# Patient Record
Sex: FEMALE | Race: ASIAN | NOT HISPANIC OR LATINO
[De-identification: names, ages, dates, MRNs, and addresses within clinical notes are randomized per-mention and may not be internally consistent; named-entity substitution may affect disease eponyms.]

---

## 2019-06-06 PROBLEM — Z00.00 ENCOUNTER FOR PREVENTIVE HEALTH EXAMINATION: Status: ACTIVE | Noted: 2019-06-06

## 2019-06-17 ENCOUNTER — APPOINTMENT (OUTPATIENT)
Dept: COLORECTAL SURGERY | Facility: CLINIC | Age: 29
End: 2019-06-17
Payer: COMMERCIAL

## 2019-06-17 VITALS
WEIGHT: 125 LBS | DIASTOLIC BLOOD PRESSURE: 84 MMHG | HEIGHT: 60 IN | TEMPERATURE: 98.3 F | HEART RATE: 78 BPM | SYSTOLIC BLOOD PRESSURE: 123 MMHG | OXYGEN SATURATION: 99 % | BODY MASS INDEX: 24.54 KG/M2

## 2019-06-17 DIAGNOSIS — Z82.49 FAMILY HISTORY OF ISCHEMIC HEART DISEASE AND OTHER DISEASES OF THE CIRCULATORY SYSTEM: ICD-10-CM

## 2019-06-17 DIAGNOSIS — Z91.018 ALLERGY TO OTHER FOODS: ICD-10-CM

## 2019-06-17 DIAGNOSIS — Z78.9 OTHER SPECIFIED HEALTH STATUS: ICD-10-CM

## 2019-06-17 DIAGNOSIS — Z80.3 FAMILY HISTORY OF MALIGNANT NEOPLASM OF BREAST: ICD-10-CM

## 2019-06-17 DIAGNOSIS — Z91.09 OTHER ALLERGY STATUS, OTHER THAN TO DRUGS AND BIOLOGICAL SUBSTANCES: ICD-10-CM

## 2019-06-17 DIAGNOSIS — Z80.7 FAMILY HISTORY OF OTHER MALIGNANT NEOPLASMS OF LYMPHOID, HEMATOPOIETIC AND RELATED TISSUES: ICD-10-CM

## 2019-06-17 DIAGNOSIS — Z83.79 FAMILY HISTORY OF OTHER DISEASES OF THE DIGESTIVE SYSTEM: ICD-10-CM

## 2019-06-17 DIAGNOSIS — Z72.3 LACK OF PHYSICAL EXERCISE: ICD-10-CM

## 2019-06-17 DIAGNOSIS — L91.8 OTHER HYPERTROPHIC DISORDERS OF THE SKIN: ICD-10-CM

## 2019-06-17 DIAGNOSIS — Z80.0 FAMILY HISTORY OF MALIGNANT NEOPLASM OF DIGESTIVE ORGANS: ICD-10-CM

## 2019-06-17 DIAGNOSIS — Z80.1 FAMILY HISTORY OF MALIGNANT NEOPLASM OF TRACHEA, BRONCHUS AND LUNG: ICD-10-CM

## 2019-06-17 PROCEDURE — 46600 DIAGNOSTIC ANOSCOPY SPX: CPT

## 2019-06-17 PROCEDURE — 99203 OFFICE O/P NEW LOW 30 MIN: CPT | Mod: 25

## 2019-06-18 NOTE — HISTORY OF PRESENT ILLNESS
[FreeTextEntry1] : 29yo female presents for evaluation of anal fissure and skin tag, referred by Dr Eastman. \par Chart referral notes reviewed. \par Has been managed by her GI since Feb 2018 for BRBPR, bloating and anal pain/burning. Seen previously by CRS Dr Taveras Oct 2018, started taking fiber supplement for hemorrhoids and skin tag at that time, symptoms persisted despite topical hemorrhoid cream. Followed up with GI, s/p IRCx2 for hemorrhoids. Also noted to have anal fissure with tag on exam by GI since 10/2018, managed with nifedipine cream. Seen in f/u April 2019 noted to have posterior midline anal fissure, non compliant with nifedipine cream per GI notes. Switched to Rectiv by Dr Eastman as patient is considering getting pregnant soon. \par Per patient never started Rectiv due to medication being too expensive\par Has been using nifedipine since April at least daily, trying to use BID but not always\par Continues to feel pain with some but not all BMs, occasional BRBPR, would like skin tag removed\par Moves bowels every morning, on average 2-3 BMs per day\par Denies diarrhea or constipation\par Has decided to delay pregnancy until after fissure is managed\par \par Never had a colonoscopy\par Family history of CRC in grandmother (age 93)

## 2019-06-18 NOTE — CONSULT LETTER
[Dear  ___] : Dear  [unfilled], [Consult Letter:] : I had the pleasure of evaluating your patient, [unfilled]. [( Thank you for referring [unfilled] for consultation for _____ )] : Thank you for referring [unfilled] for consultation for [unfilled] [Please see my note below.] : Please see my note below. [Consult Closing:] : Thank you very much for allowing me to participate in the care of this patient.  If you have any questions, please do not hesitate to contact me. [Sincerely,] : Sincerely, [FreeTextEntry2] : Dr. Maria L Eastman M.D\par \par  [FreeTextEntry3] : Berry MD

## 2019-06-18 NOTE — PHYSICAL EXAM
[FreeTextEntry1] : Medical assistant present for duration of physical examination\par \par Gen NAD, AOx3\par \par Anorectal Exam:\par Inspection no erythema, induration or fluctuance, no skin excoriation, posterior midline anal fissure with prominent associated tag, small anterior external hemorrhoidal tag\par [place] nontender, no masses palpated, no blood on gloved finger\par Anoscopy chronic appearing posterior midline fissure, mild nonfriable internal hemorrhoids\par \par

## 2019-06-26 ENCOUNTER — TRANSCRIPTION ENCOUNTER (OUTPATIENT)
Age: 29
End: 2019-06-26

## 2019-08-02 ENCOUNTER — RESULT REVIEW (OUTPATIENT)
Age: 29
End: 2019-08-02

## 2019-08-02 ENCOUNTER — OUTPATIENT (OUTPATIENT)
Dept: OUTPATIENT SERVICES | Facility: HOSPITAL | Age: 29
LOS: 1 days | Discharge: HOME | End: 2019-08-02
Payer: COMMERCIAL

## 2019-08-02 ENCOUNTER — APPOINTMENT (OUTPATIENT)
Dept: COLORECTAL SURGERY | Facility: CLINIC | Age: 29
End: 2019-08-02

## 2019-08-02 PROCEDURE — 46505 CHEMODENERVATION ANAL MUSC: CPT | Mod: GC,59

## 2019-08-02 PROCEDURE — 46200 REMOVAL OF ANAL FISSURE: CPT | Mod: GC

## 2019-08-02 RX ORDER — SENNOSIDES/DOCUSATE SODIUM 8.6MG-50MG
1 TABLET ORAL
Qty: 10 | Refills: 0
Start: 2019-08-02

## 2019-08-09 LAB — SURGICAL PATHOLOGY STUDY: SIGNIFICANT CHANGE UP

## 2019-10-07 ENCOUNTER — APPOINTMENT (OUTPATIENT)
Dept: COLORECTAL SURGERY | Facility: CLINIC | Age: 29
End: 2019-10-07
Payer: COMMERCIAL

## 2019-10-07 VITALS
HEART RATE: 69 BPM | OXYGEN SATURATION: 99 % | DIASTOLIC BLOOD PRESSURE: 79 MMHG | HEIGHT: 60 IN | WEIGHT: 125 LBS | TEMPERATURE: 98.3 F | SYSTOLIC BLOOD PRESSURE: 117 MMHG | BODY MASS INDEX: 24.54 KG/M2

## 2019-10-07 DIAGNOSIS — K60.2 ANAL FISSURE, UNSPECIFIED: ICD-10-CM

## 2019-10-07 DIAGNOSIS — K64.8 OTHER HEMORRHOIDS: ICD-10-CM

## 2019-10-07 PROCEDURE — 99024 POSTOP FOLLOW-UP VISIT: CPT

## 2019-10-07 RX ORDER — HYDROCORTISONE 25 MG/G
2.5 CREAM TOPICAL DAILY
Qty: 1 | Refills: 3 | Status: ACTIVE | COMMUNITY
Start: 2019-10-07 | End: 1900-01-01

## 2019-10-07 NOTE — ASSESSMENT
[FreeTextEntry1] : Recovering well, patient reassured\par Continue high fiber diet, adequate oral hydration, stool softeners and sitz baths as needed\par Avoid constipation and straining\par Hydrocortisone prn if hemorrhoidal symptoms\par All questions were answered, patient expressed understanding, and is agreeable to this plan.\par

## 2019-10-07 NOTE — PHYSICAL EXAM
[FreeTextEntry1] : Medical assistant present for duration of physical examination\par \par Gen NAD, AOx3\par \par Anorectal Exam:\par Inspection no erythema, induration or fluctuance, posterior midline fissurectomy site well healing\par [place] nontender, no masses palpated, no blood on gloved finger, good tone, good squeeze\par Anoscopy  mild internal hemorrhoids\par \par

## 2019-10-07 NOTE — HISTORY OF PRESENT ILLNESS
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

## 2023-01-01 NOTE — ASSESSMENT
[FreeTextEntry1] : Exam findings and diagnosis were discussed at length with patient. \par Recommendations including increased fiber intake, adequate daily hydration, stool softeners as needed, and sitz baths as needed and after bowel movements were discussed.\par Medical management, such diltiazem and nitroglycerin cream was discussed. \par Concern for chronic fissure discussed and role of surgical intervention for chronic fissures refractory to medical management was discussed.\par Patient would like to have fissure addressed prior to trying to get pregnant\par Surgical options were discussed, including exam under anesthesia, fissurectomy and excision of anal tag, botox injection vs lateral partial internal sphincterotomy. Risk/benefits/alternatives discussed at length, including but not limited to pain, bleeding, need for future surgery, and risk of alteration of bowel habits, such as seepage, fecal urgency and/or fecal (gas, liquid or solid) incontinence discussed, which may be temporary or permanent.\par All questions were answered, patient expressed understanding, and would like to proceed with surgery\par Informed consent was obtained. Ambulatory surgery instructions were given to patient\par Will plan for EUA, fissurectomy with exicision of posterior midline anal tag, botox injection  Statement Selected